# Patient Record
Sex: FEMALE | Race: WHITE | NOT HISPANIC OR LATINO
[De-identification: names, ages, dates, MRNs, and addresses within clinical notes are randomized per-mention and may not be internally consistent; named-entity substitution may affect disease eponyms.]

---

## 2017-05-08 ENCOUNTER — APPOINTMENT (OUTPATIENT)
Dept: PULMONOLOGY | Facility: CLINIC | Age: 67
End: 2017-05-08

## 2017-06-22 ENCOUNTER — RESULT REVIEW (OUTPATIENT)
Age: 67
End: 2017-06-22

## 2017-07-27 ENCOUNTER — APPOINTMENT (OUTPATIENT)
Dept: UROLOGY | Facility: CLINIC | Age: 67
End: 2017-07-27
Payer: MEDICARE

## 2017-07-27 VITALS — DIASTOLIC BLOOD PRESSURE: 82 MMHG | OXYGEN SATURATION: 97 % | HEART RATE: 70 BPM | SYSTOLIC BLOOD PRESSURE: 128 MMHG

## 2017-07-27 PROCEDURE — 99204 OFFICE O/P NEW MOD 45 MIN: CPT

## 2017-08-01 ENCOUNTER — OUTPATIENT (OUTPATIENT)
Dept: OUTPATIENT SERVICES | Facility: HOSPITAL | Age: 67
LOS: 1 days | End: 2017-08-01
Payer: MEDICARE

## 2017-08-01 DIAGNOSIS — M54.16 RADICULOPATHY, LUMBAR REGION: ICD-10-CM

## 2017-08-01 PROCEDURE — 77003 FLUOROGUIDE FOR SPINE INJECT: CPT

## 2017-08-01 PROCEDURE — 62323 NJX INTERLAMINAR LMBR/SAC: CPT

## 2017-08-02 LAB
APPEARANCE: ABNORMAL
BACTERIA UR CULT: NORMAL
BACTERIA: ABNORMAL
BILIRUBIN URINE: NEGATIVE
BLOOD URINE: ABNORMAL
CALCIUM OXALATE CRYSTALS: ABNORMAL
COLOR: ABNORMAL
GLUCOSE QUALITATIVE U: NORMAL MG/DL
HYALINE CASTS: 0 /LPF
KETONES URINE: NEGATIVE
LEUKOCYTE ESTERASE URINE: NEGATIVE
MICROSCOPIC-UA: NORMAL
NITRITE URINE: NEGATIVE
PH URINE: 5.5
PROTEIN URINE: ABNORMAL MG/DL
RED BLOOD CELLS URINE: 4 /HPF
SPECIFIC GRAVITY URINE: 1.02
SQUAMOUS EPITHELIAL CELLS: >27 /HPF
UROBILINOGEN URINE: NORMAL MG/DL
WHITE BLOOD CELLS URINE: 14 /HPF

## 2017-08-21 ENCOUNTER — APPOINTMENT (OUTPATIENT)
Dept: UROLOGY | Facility: CLINIC | Age: 67
End: 2017-08-21
Payer: MEDICARE

## 2017-08-21 VITALS — HEART RATE: 74 BPM | OXYGEN SATURATION: 97 % | SYSTOLIC BLOOD PRESSURE: 163 MMHG | DIASTOLIC BLOOD PRESSURE: 80 MMHG

## 2017-08-21 DIAGNOSIS — N39.0 URINARY TRACT INFECTION, SITE NOT SPECIFIED: ICD-10-CM

## 2017-08-21 DIAGNOSIS — R31.29 OTHER MICROSCOPIC HEMATURIA: ICD-10-CM

## 2017-08-21 DIAGNOSIS — R82.90 UNSPECIFIED ABNORMAL FINDINGS IN URINE: ICD-10-CM

## 2017-08-21 PROCEDURE — 51701 INSERT BLADDER CATHETER: CPT

## 2017-08-23 LAB
APPEARANCE: CLEAR
BACTERIA UR CULT: NORMAL
BACTERIA: NEGATIVE
BILIRUBIN URINE: NEGATIVE
BLOOD URINE: ABNORMAL
COLOR: YELLOW
GLUCOSE QUALITATIVE U: NORMAL MG/DL
HYALINE CASTS: 1 /LPF
KETONES URINE: NEGATIVE
LEUKOCYTE ESTERASE URINE: NEGATIVE
MICROSCOPIC-UA: NORMAL
NITRITE URINE: NEGATIVE
PH URINE: 5.5
PROTEIN URINE: NEGATIVE MG/DL
RED BLOOD CELLS URINE: 1 /HPF
SPECIFIC GRAVITY URINE: 1.01
SQUAMOUS EPITHELIAL CELLS: 0 /HPF
UROBILINOGEN URINE: NORMAL MG/DL
WHITE BLOOD CELLS URINE: 0 /HPF

## 2017-09-07 ENCOUNTER — OUTPATIENT (OUTPATIENT)
Dept: OUTPATIENT SERVICES | Facility: HOSPITAL | Age: 67
LOS: 1 days | Discharge: HOME | End: 2017-09-07
Payer: MEDICARE

## 2017-09-07 DIAGNOSIS — M54.16 RADICULOPATHY, LUMBAR REGION: ICD-10-CM

## 2017-09-07 PROCEDURE — 77003 FLUOROGUIDE FOR SPINE INJECT: CPT

## 2017-09-07 PROCEDURE — 62323 NJX INTERLAMINAR LMBR/SAC: CPT

## 2017-10-17 ENCOUNTER — OUTPATIENT (OUTPATIENT)
Dept: OUTPATIENT SERVICES | Facility: HOSPITAL | Age: 67
LOS: 1 days | End: 2017-10-17
Payer: MEDICARE

## 2017-10-17 DIAGNOSIS — M54.16 RADICULOPATHY, LUMBAR REGION: ICD-10-CM

## 2017-10-17 PROCEDURE — 64483 NJX AA&/STRD TFRM EPI L/S 1: CPT | Mod: RT

## 2017-10-17 PROCEDURE — 64484 NJX AA&/STRD TFRM EPI L/S EA: CPT | Mod: RT

## 2017-10-17 PROCEDURE — 77003 FLUOROGUIDE FOR SPINE INJECT: CPT

## 2018-02-06 ENCOUNTER — OUTPATIENT (OUTPATIENT)
Dept: OUTPATIENT SERVICES | Facility: HOSPITAL | Age: 68
LOS: 1 days | End: 2018-02-06
Payer: MEDICARE

## 2018-02-06 ENCOUNTER — APPOINTMENT (OUTPATIENT)
Dept: RADIOLOGY | Facility: CLINIC | Age: 68
End: 2018-02-06
Payer: MEDICARE

## 2018-02-06 DIAGNOSIS — Z00.8 ENCOUNTER FOR OTHER GENERAL EXAMINATION: ICD-10-CM

## 2018-02-06 PROCEDURE — 71046 X-RAY EXAM CHEST 2 VIEWS: CPT | Mod: 26

## 2018-02-06 PROCEDURE — 71046 X-RAY EXAM CHEST 2 VIEWS: CPT

## 2018-09-20 ENCOUNTER — OUTPATIENT (OUTPATIENT)
Dept: OUTPATIENT SERVICES | Facility: HOSPITAL | Age: 68
LOS: 1 days | End: 2018-09-20
Payer: MEDICARE

## 2018-09-20 DIAGNOSIS — M54.16 RADICULOPATHY, LUMBAR REGION: ICD-10-CM

## 2018-09-20 PROCEDURE — 64483 NJX AA&/STRD TFRM EPI L/S 1: CPT | Mod: RT

## 2018-09-20 PROCEDURE — 64484 NJX AA&/STRD TFRM EPI L/S EA: CPT | Mod: RT

## 2018-09-20 PROCEDURE — 77003 FLUOROGUIDE FOR SPINE INJECT: CPT

## 2019-04-16 ENCOUNTER — RESULT REVIEW (OUTPATIENT)
Age: 69
End: 2019-04-16

## 2020-03-14 ENCOUNTER — TRANSCRIPTION ENCOUNTER (OUTPATIENT)
Age: 70
End: 2020-03-14

## 2020-03-22 ENCOUNTER — TRANSCRIPTION ENCOUNTER (OUTPATIENT)
Age: 70
End: 2020-03-22

## 2020-05-21 ENCOUNTER — TRANSCRIPTION ENCOUNTER (OUTPATIENT)
Age: 70
End: 2020-05-21

## 2020-06-29 DIAGNOSIS — G47.00 INSOMNIA, UNSPECIFIED: ICD-10-CM

## 2020-06-29 DIAGNOSIS — E53.8 DEFICIENCY OF OTHER SPECIFIED B GROUP VITAMINS: ICD-10-CM

## 2020-06-29 DIAGNOSIS — I34.0 NONRHEUMATIC MITRAL (VALVE) INSUFFICIENCY: ICD-10-CM

## 2020-06-29 DIAGNOSIS — I51.89 OTHER ILL-DEFINED HEART DISEASES: ICD-10-CM

## 2020-06-29 DIAGNOSIS — I65.29 OCCLUSION AND STENOSIS OF UNSPECIFIED CAROTID ARTERY: ICD-10-CM

## 2020-06-29 DIAGNOSIS — R60.9 EDEMA, UNSPECIFIED: ICD-10-CM

## 2020-06-29 DIAGNOSIS — I07.1 RHEUMATIC TRICUSPID INSUFFICIENCY: ICD-10-CM

## 2020-06-29 DIAGNOSIS — E55.9 VITAMIN D DEFICIENCY, UNSPECIFIED: ICD-10-CM

## 2020-06-29 DIAGNOSIS — I51.7 CARDIOMEGALY: ICD-10-CM

## 2020-06-29 RX ORDER — GABAPENTIN 300 MG/1
300 CAPSULE ORAL TWICE DAILY
Refills: 0 | Status: ACTIVE | COMMUNITY
Start: 2020-06-29

## 2020-06-29 RX ORDER — FUROSEMIDE 20 MG/1
20 TABLET ORAL
Refills: 0 | Status: DISCONTINUED | COMMUNITY
End: 2020-06-29

## 2020-06-29 RX ORDER — ZOLPIDEM TARTRATE 10 MG/1
10 TABLET, FILM COATED ORAL
Refills: 0 | Status: DISCONTINUED | COMMUNITY
End: 2020-06-29

## 2020-06-29 RX ORDER — AMLODIPINE BESYLATE 2.5 MG/1
2.5 TABLET ORAL
Refills: 0 | Status: DISCONTINUED | COMMUNITY
End: 2020-06-29

## 2020-06-29 RX ORDER — LEVOTHYROXINE SODIUM 137 UG/1
137 TABLET ORAL
Refills: 0 | Status: DISCONTINUED | COMMUNITY
End: 2020-06-29

## 2020-06-29 RX ORDER — LOSARTAN POTASSIUM 50 MG/1
50 TABLET, FILM COATED ORAL
Qty: 90 | Refills: 1 | Status: ACTIVE | COMMUNITY
Start: 2020-06-29

## 2020-06-29 RX ORDER — TRAZODONE HYDROCHLORIDE 100 MG/1
100 TABLET ORAL
Refills: 0 | Status: ACTIVE | COMMUNITY
Start: 2020-06-29

## 2020-06-29 RX ORDER — LEVOTHYROXINE SODIUM 0.14 MG/1
137 TABLET ORAL DAILY
Refills: 0 | Status: ACTIVE | COMMUNITY
Start: 2020-06-29

## 2020-07-01 ENCOUNTER — APPOINTMENT (OUTPATIENT)
Dept: ELECTROPHYSIOLOGY | Facility: CLINIC | Age: 70
End: 2020-07-01
Payer: MEDICARE

## 2020-07-01 VITALS
DIASTOLIC BLOOD PRESSURE: 78 MMHG | WEIGHT: 176 LBS | HEART RATE: 60 BPM | SYSTOLIC BLOOD PRESSURE: 132 MMHG | HEIGHT: 65 IN | BODY MASS INDEX: 29.32 KG/M2

## 2020-07-01 DIAGNOSIS — F48.8 OTHER SPECIFIED NONPSYCHOTIC MENTAL DISORDERS: ICD-10-CM

## 2020-07-01 DIAGNOSIS — R55 SYNCOPE AND COLLAPSE: ICD-10-CM

## 2020-07-01 PROCEDURE — 99204 OFFICE O/P NEW MOD 45 MIN: CPT

## 2020-07-01 NOTE — REVIEW OF SYSTEMS
[Shortness Of Breath] : no shortness of breath [Dyspnea on exertion] : not dyspnea during exertion [Chest  Pressure] : no chest pressure [Chest Pain] : no chest pain [Lower Ext Edema] : no extremity edema [Palpitations] : palpitations [Dizziness] : dizziness [Nausea] : nausea [Confusion] : no confusion was observed [Convulsions] : no convulsions [Anxiety] : anxiety [Easy Bleeding] : no tendency for easy bleeding [Easy Bruising] : no tendency for easy bruising [Negative] : Integumentary

## 2020-07-01 NOTE — HISTORY OF PRESENT ILLNESS
[FreeTextEntry1] : 70 year old woman with history of HTN, HLD, hypothyroidism, LBBB, presenting for evaluation of syncope and palpitation. \par She has had three episodes of syncope over the last 1.5 yrs, the last occurred in 5/2020. During these episodes she notes sudden onset of rapid and regular palpitation, followed by feeling of dizziness (she describes the room spinning) and then subsequently passes out. She feels well when she wakes up, and has not sought urgent medical attention. She has not had trauma with these episodes, and she usually feels time to sit down first. She has noted nausea after the episodes as well.\par She has also had palpitation over the last year, which have occurred frequently without associated lightheadedness. \par At other times, she gets dizziness without palpitation. During this she feels the room spinning for up to a couple minutes.\par She had been previously given Toprol for her symptoms, but felt worse and could not tolerate it.  \par She otherwise feels generally well, but has had fatigue, and stable exertional dyspnea, as well as anxiety. \par Holter monitor performed 8/8/19 revealed sinus rhythm with average HR 65 bpm (range  bpm), frequent PVCs, couplets, bigeminy and trigeminy (total 6053, 6.2% PVC burden). The PVCs appear to have an outflow tract etiology. She does not recall if she had symptoms during this monitor. \par ECG reveals sinus rhythm with LBBB and  ms.\par TTE has revealed normal LV function, and stress test showed no ischemia or infarction.\par Current meds include synthroid, losartan, lasix\par

## 2020-07-01 NOTE — REASON FOR VISIT
[Palpitations] : palpitations [Consultation] : a consultation regarding [Syncope] : syncope [FreeTextEntry1] : ref Dr Foster

## 2020-07-01 NOTE — PHYSICAL EXAM
[Well Groomed] : well groomed [General Appearance - Well Developed] : well developed [General Appearance - Well Nourished] : well nourished [General Appearance - In No Acute Distress] : no acute distress [Normal Jugular Venous V Waves Present] : normal jugular venous V waves present [Normal Conjunctiva] : the conjunctiva exhibited no abnormalities [Normal Oral Mucosa] : normal oral mucosa [Heart Rate And Rhythm] : heart rate and rhythm were normal [Heart Sounds] : normal S1 and S2 [FreeTextEntry1] : 2/6 MANUELITO, KARLI [Edema] : no peripheral edema present [Auscultation Breath Sounds / Voice Sounds] : lungs were clear to auscultation bilaterally [Abdomen Soft] : soft [Respiration, Rhythm And Depth] : normal respiratory rhythm and effort [Abnormal Walk] : normal gait [Abdomen Tenderness] : non-tender [Nail Clubbing] : no clubbing of the fingernails [] : no rash [Cyanosis, Localized] : no localized cyanosis [Oriented To Time, Place, And Person] : oriented to person, place, and time [Impaired Insight] : insight and judgment were intact

## 2020-07-01 NOTE — DISCUSSION/SUMMARY
[FreeTextEntry1] : 70 year old woman with history of HTN, HLD, hypothyroidism, LBBB, presenting for evaluation of syncope and palpitation. She has had episodes of syncope which are preceded by palpitation, as well as other episodes of palpitation without syncope, as well as dizziness. She has conduction disease with LBBB, and had rather frequent PVCs on Holter monitoring last year, but it is unclear if her symptoms are related to the PVCs, potentially VT, or perhaps bradyarrhythmias. We discussed that it is also possible that her symptoms may not be cardiac in origin, and there is a potential for vagal etiology or perhaps vertigo given her description of the symptoms and associated nausea. However, given her clear conduction disease, frequent PVCs, and risk factors, further cardiac evaluation is warranted. We discussed monitoring options, as well as EP study to evaluate extent of conduction disease. If significant conduction disease is noted, a ppm would be beneficial to prevent further syncope and progression to heart block. If no significant conduction disease is noted, an ILR implant would be useful for ongoing monitoring and symptom correlation. Finally, if a tachyarrhythmia is inducible, such as VT or SVT, or if the PVCs are thought to contribute to her symptoms, a targeted ablation may be the best approach, particularly as she has not been able to tolerate medical therapy with beta blockade in the past. The risks and benefits of these procedures, including procedure related risks of EP study, device implant, and ablation (such as bleeding, vascular injury, cardiac perforation, stroke, infection, pulmonary injury) were reviewed. She expressed understanding and does agree to proceed. \par -EP study, possible PPM or ILR implant depending on findings. Also possible ablation of SVT/PVCs/VT if indicated. \par  \par

## 2020-07-02 PROBLEM — Z00.00 ENCOUNTER FOR PREVENTIVE HEALTH EXAMINATION: Noted: 2020-07-02

## 2020-07-06 ENCOUNTER — APPOINTMENT (OUTPATIENT)
Dept: DISASTER EMERGENCY | Facility: CLINIC | Age: 70
End: 2020-07-06

## 2020-07-06 DIAGNOSIS — Z01.818 ENCOUNTER FOR OTHER PREPROCEDURAL EXAMINATION: ICD-10-CM

## 2020-07-07 LAB — SARS-COV-2 N GENE NPH QL NAA+PROBE: NOT DETECTED

## 2020-07-20 ENCOUNTER — APPOINTMENT (OUTPATIENT)
Dept: DISASTER EMERGENCY | Facility: CLINIC | Age: 70
End: 2020-07-20

## 2020-07-20 DIAGNOSIS — Z01.818 ENCOUNTER FOR OTHER PREPROCEDURAL EXAMINATION: ICD-10-CM

## 2020-07-23 LAB — SARS-COV-2 N GENE NPH QL NAA+PROBE: NOT DETECTED

## 2020-08-15 ENCOUNTER — APPOINTMENT (OUTPATIENT)
Dept: DISASTER EMERGENCY | Facility: CLINIC | Age: 70
End: 2020-08-15

## 2020-08-16 LAB — SARS-COV-2 N GENE NPH QL NAA+PROBE: NOT DETECTED

## 2020-08-17 ENCOUNTER — TRANSCRIPTION ENCOUNTER (OUTPATIENT)
Age: 70
End: 2020-08-17

## 2020-08-17 ENCOUNTER — OUTPATIENT (OUTPATIENT)
Dept: OUTPATIENT SERVICES | Facility: HOSPITAL | Age: 70
LOS: 1 days | Discharge: HOME | End: 2020-08-17
Payer: MEDICARE

## 2020-08-17 VITALS
SYSTOLIC BLOOD PRESSURE: 199 MMHG | DIASTOLIC BLOOD PRESSURE: 102 MMHG | OXYGEN SATURATION: 99 % | RESPIRATION RATE: 18 BRPM | HEIGHT: 64 IN | HEART RATE: 91 BPM | TEMPERATURE: 98 F | WEIGHT: 184.97 LBS

## 2020-08-17 VITALS
DIASTOLIC BLOOD PRESSURE: 73 MMHG | SYSTOLIC BLOOD PRESSURE: 174 MMHG | HEART RATE: 70 BPM | RESPIRATION RATE: 18 BRPM | OXYGEN SATURATION: 98 %

## 2020-08-17 DIAGNOSIS — E03.9 HYPOTHYROIDISM, UNSPECIFIED: ICD-10-CM

## 2020-08-17 DIAGNOSIS — R55 SYNCOPE AND COLLAPSE: ICD-10-CM

## 2020-08-17 DIAGNOSIS — I44.7 LEFT BUNDLE-BRANCH BLOCK, UNSPECIFIED: ICD-10-CM

## 2020-08-17 DIAGNOSIS — I10 ESSENTIAL (PRIMARY) HYPERTENSION: ICD-10-CM

## 2020-08-17 DIAGNOSIS — I49.3 VENTRICULAR PREMATURE DEPOLARIZATION: ICD-10-CM

## 2020-08-17 LAB
ABO RH CONFIRMATION: SIGNIFICANT CHANGE UP
ANION GAP SERPL CALC-SCNC: 14 MMOL/L — SIGNIFICANT CHANGE UP (ref 5–17)
APTT BLD: 31.5 SEC — SIGNIFICANT CHANGE UP (ref 27.5–35.5)
BASOPHILS # BLD AUTO: 0.05 K/UL — SIGNIFICANT CHANGE UP (ref 0–0.2)
BASOPHILS NFR BLD AUTO: 0.9 % — SIGNIFICANT CHANGE UP (ref 0–2)
BLD GP AB SCN SERPL QL: SIGNIFICANT CHANGE UP
BUN SERPL-MCNC: 18 MG/DL — SIGNIFICANT CHANGE UP (ref 8–20)
CALCIUM SERPL-MCNC: 9.6 MG/DL — SIGNIFICANT CHANGE UP (ref 8.6–10.2)
CHLORIDE SERPL-SCNC: 100 MMOL/L — SIGNIFICANT CHANGE UP (ref 98–107)
CO2 SERPL-SCNC: 27 MMOL/L — SIGNIFICANT CHANGE UP (ref 22–29)
CREAT SERPL-MCNC: 1.06 MG/DL — SIGNIFICANT CHANGE UP (ref 0.5–1.3)
EOSINOPHIL # BLD AUTO: 0.16 K/UL — SIGNIFICANT CHANGE UP (ref 0–0.5)
EOSINOPHIL NFR BLD AUTO: 2.9 % — SIGNIFICANT CHANGE UP (ref 0–6)
GLUCOSE SERPL-MCNC: 95 MG/DL — SIGNIFICANT CHANGE UP (ref 70–99)
HCT VFR BLD CALC: 46 % — HIGH (ref 34.5–45)
HGB BLD-MCNC: 15 G/DL — SIGNIFICANT CHANGE UP (ref 11.5–15.5)
IMM GRANULOCYTES NFR BLD AUTO: 0.4 % — SIGNIFICANT CHANGE UP (ref 0–1.5)
INR BLD: 1.01 RATIO — SIGNIFICANT CHANGE UP (ref 0.88–1.16)
LYMPHOCYTES # BLD AUTO: 1.52 K/UL — SIGNIFICANT CHANGE UP (ref 1–3.3)
LYMPHOCYTES # BLD AUTO: 27.7 % — SIGNIFICANT CHANGE UP (ref 13–44)
MAGNESIUM SERPL-MCNC: 2.2 MG/DL — SIGNIFICANT CHANGE UP (ref 1.6–2.6)
MCHC RBC-ENTMCNC: 30.2 PG — SIGNIFICANT CHANGE UP (ref 27–34)
MCHC RBC-ENTMCNC: 32.6 GM/DL — SIGNIFICANT CHANGE UP (ref 32–36)
MCV RBC AUTO: 92.6 FL — SIGNIFICANT CHANGE UP (ref 80–100)
MONOCYTES # BLD AUTO: 0.48 K/UL — SIGNIFICANT CHANGE UP (ref 0–0.9)
MONOCYTES NFR BLD AUTO: 8.7 % — SIGNIFICANT CHANGE UP (ref 2–14)
NEUTROPHILS # BLD AUTO: 3.26 K/UL — SIGNIFICANT CHANGE UP (ref 1.8–7.4)
NEUTROPHILS NFR BLD AUTO: 59.4 % — SIGNIFICANT CHANGE UP (ref 43–77)
PLATELET # BLD AUTO: 284 K/UL — SIGNIFICANT CHANGE UP (ref 150–400)
POTASSIUM SERPL-MCNC: 3.9 MMOL/L — SIGNIFICANT CHANGE UP (ref 3.5–5.3)
POTASSIUM SERPL-SCNC: 3.9 MMOL/L — SIGNIFICANT CHANGE UP (ref 3.5–5.3)
PROTHROM AB SERPL-ACNC: 11.7 SEC — SIGNIFICANT CHANGE UP (ref 10.6–13.6)
RBC # BLD: 4.97 M/UL — SIGNIFICANT CHANGE UP (ref 3.8–5.2)
RBC # FLD: 12.8 % — SIGNIFICANT CHANGE UP (ref 10.3–14.5)
SODIUM SERPL-SCNC: 141 MMOL/L — SIGNIFICANT CHANGE UP (ref 135–145)
WBC # BLD: 5.49 K/UL — SIGNIFICANT CHANGE UP (ref 3.8–10.5)
WBC # FLD AUTO: 5.49 K/UL — SIGNIFICANT CHANGE UP (ref 3.8–10.5)

## 2020-08-17 PROCEDURE — 86900 BLOOD TYPING SEROLOGIC ABO: CPT

## 2020-08-17 PROCEDURE — 33285 INSJ SUBQ CAR RHYTHM MNTR: CPT | Mod: 59

## 2020-08-17 PROCEDURE — 85610 PROTHROMBIN TIME: CPT

## 2020-08-17 PROCEDURE — 85027 COMPLETE CBC AUTOMATED: CPT

## 2020-08-17 PROCEDURE — 93005 ELECTROCARDIOGRAM TRACING: CPT

## 2020-08-17 PROCEDURE — C1764: CPT

## 2020-08-17 PROCEDURE — C1731: CPT

## 2020-08-17 PROCEDURE — 36415 COLL VENOUS BLD VENIPUNCTURE: CPT

## 2020-08-17 PROCEDURE — 93620 COMP EP EVL R AT VEN PAC&REC: CPT

## 2020-08-17 PROCEDURE — C1730: CPT

## 2020-08-17 PROCEDURE — C1760: CPT

## 2020-08-17 PROCEDURE — 85730 THROMBOPLASTIN TIME PARTIAL: CPT

## 2020-08-17 PROCEDURE — 86901 BLOOD TYPING SEROLOGIC RH(D): CPT

## 2020-08-17 PROCEDURE — 93010 ELECTROCARDIOGRAM REPORT: CPT | Mod: 76

## 2020-08-17 PROCEDURE — 83735 ASSAY OF MAGNESIUM: CPT

## 2020-08-17 PROCEDURE — 93620 COMP EP EVL R AT VEN PAC&REC: CPT | Mod: 26

## 2020-08-17 PROCEDURE — 86850 RBC ANTIBODY SCREEN: CPT

## 2020-08-17 PROCEDURE — 80048 BASIC METABOLIC PNL TOTAL CA: CPT

## 2020-08-17 RX ORDER — ACETAMINOPHEN 500 MG
650 TABLET ORAL EVERY 6 HOURS
Refills: 0 | Status: DISCONTINUED | OUTPATIENT
Start: 2020-08-17 | End: 2020-09-01

## 2020-08-17 RX ORDER — MEPERIDINE HYDROCHLORIDE 50 MG/ML
12.5 INJECTION INTRAMUSCULAR; INTRAVENOUS; SUBCUTANEOUS ONCE
Refills: 0 | Status: DISCONTINUED | OUTPATIENT
Start: 2020-08-17 | End: 2020-08-17

## 2020-08-17 RX ADMIN — MEPERIDINE HYDROCHLORIDE 12.5 MILLIGRAM(S): 50 INJECTION INTRAMUSCULAR; INTRAVENOUS; SUBCUTANEOUS at 12:04

## 2020-08-17 RX ADMIN — MEPERIDINE HYDROCHLORIDE 12.5 MILLIGRAM(S): 50 INJECTION INTRAMUSCULAR; INTRAVENOUS; SUBCUTANEOUS at 11:16

## 2020-08-17 NOTE — DISCHARGE NOTE PROVIDER - NSDCMRMEDTOKEN_GEN_ALL_CORE_FT
furosemide 20 mg oral tablet: 1 tab(s) orally once a day  gabapentin 300 mg oral capsule: 1 cap(s) orally 2 times a day  losartan 50 mg oral tablet: 1 tab(s) orally once a day  Synthroid 137 mcg (0.137 mg) oral tablet: 1 tab(s) orally once a day  traZODone 100 mg oral tablet: 1 tab(s) orally once a day (at bedtime)

## 2020-08-17 NOTE — H&P PST ADULT - ASSESSMENT
70 year old woman with history of HTN, HLD, hypothyroidism, LBBB, recurrent syncope and palpitation with frequent PVCs on holter monitor. She presents for elective EP study and possible ablation vs device implant.    Plan:   Pt NPO.   BP management per anesthesia.   Consent w/ Dr. Salazar.

## 2020-08-17 NOTE — DISCHARGE NOTE PROVIDER - CARE PROVIDERS DIRECT ADDRESSES
[email or web address]

## 2020-08-17 NOTE — DISCHARGE NOTE NURSING/CASE MANAGEMENT/SOCIAL WORK - PATIENT PORTAL LINK FT
You can access the FollowMyHealth Patient Portal offered by [facility] by registering at the following website: [email or web address]. By joining [employer]’s FollowMyHealth portal, you will also be able to view your health information using other applications (apps) compatible with our system.

## 2020-08-17 NOTE — DISCHARGE NOTE PROVIDER - HOSPITAL COURSE
70 year old woman with history of HTN, HLD, hypothyroidism, LBBB, recurrent syncope and palpitation with frequent PVCs on holter monitor. She presented electively 8/17/20 and underwent EP study limited by incessant AF following catheter placement despite ibutilide with multiple DCCVs, but demonstrating no significant conduction abnormalities (despite LBBB), possible dual AV levon physiology (FL>RR) with no SVT identified, and no accessory pathway conduction, as well as ILR implant to evaluate for clinical arrhythmias, correlate with symptoms and guide further management. The patient was observed per post procedure protocol, then discharged home with a plan for outpatient follow up.

## 2020-08-17 NOTE — H&P PST ADULT - HISTORY OF PRESENT ILLNESS
70 year old woman with history of HTN, HLD, hypothyroidism, LBBB, syncope and palpitation. She has had three episodes of syncope over the last 1.5 yrs, most recently 5/2020. During these episodes she notes sudden onset of rapid and regular palpitation, followed by feeling of dizziness described as the room spinning and then subsequently passes out. She notes nausea when waking up, but otherwise feels well, and has not sought urgent medical attention. She has generally been able to sit down before syncopizing and has not had any trauma. She has also had palpitation over the last year, which have occurred frequently without associated lightheadedness, and has also had dizziness without palpitation described as the room spinning.    Holter monitor performed 8/8/19 revealed sinus rhythm with average HR 65 bpm (range  bpm), frequent PVCs, couplets, bigeminy and trigeminy (total 6053, 6.2% PVC burden). The PVCs appear to have an outflow tract etiology. She does not recall if she had symptoms during this monitor.     She presents for elective EP study and possible ablation vs device implant. Patient currently denies chest pain, shortness of breath at rest or with exertion, near/true syncope, fevers/chills, N/V/D, or other cardiac or constitutional symptoms.     On arrival, she reports she presented ~3 weeks ago for cataract surgery, and her blood pressure was noted to be elevated with SBP ~200. She spoke to her primary care physician who advised her to monitor her blood pressure over the weekend and follow up. She has not been monitoring her BP at home and has not been seen by her PCP or cardiologist. Her BP today is 200/100; she reports she took her losartan this AM.       Cardiology Summary  Stress Test: 8/19/19, normal, LVEF 64%. No ischemia/infarct.   Echo: 8/6/19, LVEF 55%, LA 3.1cm, mild MR, TR. RVSP<35   Cardiac Cath: 5/28/10, no significant CAD, only minor luminal irregularities.

## 2020-08-17 NOTE — PROGRESS NOTE ADULT - SUBJECTIVE AND OBJECTIVE BOX
Pt doing well post EPS/ILR.   Remains in sinus rhythm w/ baseline LBBB. No acute changes or arrhythmias.   Groin stable - ambulating without difficulty.     Ok to d/c home w/ outpt follow up in 2-4 weeks.

## 2020-08-17 NOTE — PROGRESS NOTE ADULT - SUBJECTIVE AND OBJECTIVE BOX
PROCEDURE(S): Electrophysiology Study and ILR Implant    ELECTRPHYSIOLOGIST(S): Alexis Salazar MD         COMPLICATIONS:  none        DISPOSITION: observation      CONDITION: stable      Pt doing well s/p EPS with incessant AF after catheter placement despite ibutilide (requiring multiple DCCVs) and ILR implant. Denies complaint.       Home Medications:  furosemide 20 mg oral tablet: 1 tab(s) orally once a day (17 Aug 2020 07:39)  gabapentin 300 mg oral capsule: 1 cap(s) orally 2 times a day (17 Aug 2020 07:39)  losartan 50 mg oral tablet: 1 tab(s) orally once a day (17 Aug 2020 07:39)  Synthroid 137 mcg (0.137 mg) oral tablet: 1 tab(s) orally once a day (17 Aug 2020 07:39)  traZODone 100 mg oral tablet: 1 tab(s) orally once a day (at bedtime) (17 Aug 2020 07:39)    MEDICATIONS  (PRN):  acetaminophen   Tablet .. 650 milliGRAM(s) Oral every 6 hours PRN Mild Pain (1 - 3)      Allergies  IV Contrast (Anaphylaxis)  penicillin (Hives; Rash)  shellfish (Angioedema; Rash; Flushing)      Exam:   T(C): 36.7 (08-17-20 @ 07:42), Max: 36.7 (08-17-20 @ 07:42)  HR: 91 (08-17-20 @ 07:42) (91 - 91)  BP: 199/102 (08-17-20 @ 07:42) (199/102 - 199/102)  RR: 18 (08-17-20 @ 07:42) (18 - 18)  SpO2: 99% (08-17-20 @ 07:42) (99% - 99%)    VSS, NAD, A&O x 3  Card: S1/S2, RRR, no m/g/r  ILR site: dermabond C/D/I; no bleeding, hematoma, erythema, exudate or edema  Resp: lungs CTA b/l  Abd: S/NT/ND  Groins: b/l femoral venous access hemostasis achieved via Vascade closure device x 4; sites C/D/I; no bleeding, hematoma, erythema, exudate or edema  Ext: baseline non pitting pedal edema L>R; distal pulses intact    ECG: SR w/ baseline LBBB; no acute changes.     Assessment:   70 year old woman with history of HTN, HLD, hypothyroidism, LBBB, recurrent syncope and palpitation with frequent PVCs on holter monitor. Now status post EP study limited by incessant AF following catheter placement despite ibutilide with multiple DCCVs, but demonstrating no significant conduction abnormalities (despite LBBB), possible dual AV levon physiology (IL>RR) with no SVT identified, and no accessory pathway conduction, as well as ILR implant to evaluate for clinical arrhythmias, correlate with symptoms and guide further management. Femoral venous hemostasis with Vascade closure devices x 4.       Plan:   Mild post anesthesia rigors - give demoral 12.5mg x 1 dose now.   Bedrest x 2 hours, then OOB with assistance and progress as tolerated.   Continue home medications. Notably, BP normalized immediately when anesthesia was administered (no anti-hypertensives given) and remains WNL now.   Please encourage ambulation once able.  Given ibutilide administration, will continue monitoring on telemetry x 6 hours.   Anticipated discharge later today with outpt follow up in 2-4 weeks.

## 2020-08-17 NOTE — DISCHARGE NOTE PROVIDER - CARE PROVIDER_API CALL
Alexis Salazar  CARDIOLOGY  [address] [place] [address]  Phone: [phone]  Fax: [phone]  Follow Up Time:     Walter Foster  CARDIOVASCULAR DISEASE  [address] [place] [id number]  Phone: [phone]  Fax: [phone]  Follow Up Time:

## 2020-08-17 NOTE — H&P PST ADULT - NSICDXPASTMEDICALHX_GEN_ALL_CORE_FT
PAST MEDICAL HISTORY:  Hypertension     Hypothyroid     LBBB (left bundle branch block)     PVC (premature ventricular contraction)

## 2020-08-17 NOTE — DISCHARGE NOTE PROVIDER - NSDCFUADDINST_GEN_ALL_CORE_FT
Follow up with Dr. Salazar in 2-4 weeks. Our office will contact you in 3-5 days to schedule this appointment. Please call [phone] with questions or concerns.

## 2021-12-07 ENCOUNTER — RESULT REVIEW (OUTPATIENT)
Age: 71
End: 2021-12-07

## 2022-04-11 ENCOUNTER — APPOINTMENT (OUTPATIENT)
Dept: ULTRASOUND IMAGING | Facility: CLINIC | Age: 72
End: 2022-04-11
Payer: MEDICARE

## 2022-04-11 ENCOUNTER — OUTPATIENT (OUTPATIENT)
Dept: OUTPATIENT SERVICES | Facility: HOSPITAL | Age: 72
LOS: 1 days | End: 2022-04-11
Payer: MEDICARE

## 2022-04-11 ENCOUNTER — APPOINTMENT (OUTPATIENT)
Dept: MRI IMAGING | Facility: CLINIC | Age: 72
End: 2022-04-11
Payer: MEDICARE

## 2022-04-11 DIAGNOSIS — Z00.00 ENCOUNTER FOR GENERAL ADULT MEDICAL EXAMINATION WITHOUT ABNORMAL FINDINGS: ICD-10-CM

## 2022-04-11 PROCEDURE — 72148 MRI LUMBAR SPINE W/O DYE: CPT | Mod: MH

## 2022-04-11 PROCEDURE — 72148 MRI LUMBAR SPINE W/O DYE: CPT | Mod: 26,MH

## 2022-04-11 PROCEDURE — 76536 US EXAM OF HEAD AND NECK: CPT | Mod: 26

## 2022-04-11 PROCEDURE — 76536 US EXAM OF HEAD AND NECK: CPT

## 2022-04-13 DIAGNOSIS — Z95.818 PRESENCE OF OTHER CARDIAC IMPLANTS AND GRAFTS: ICD-10-CM

## 2022-04-21 ENCOUNTER — NON-APPOINTMENT (OUTPATIENT)
Age: 72
End: 2022-04-21

## 2022-04-21 ENCOUNTER — APPOINTMENT (OUTPATIENT)
Dept: SURGERY | Facility: CLINIC | Age: 72
End: 2022-04-21
Payer: MEDICARE

## 2022-04-21 ENCOUNTER — LABORATORY RESULT (OUTPATIENT)
Age: 72
End: 2022-04-21

## 2022-04-21 DIAGNOSIS — R22.1 LOCALIZED SWELLING, MASS AND LUMP, NECK: ICD-10-CM

## 2022-04-21 DIAGNOSIS — E04.1 NONTOXIC SINGLE THYROID NODULE: ICD-10-CM

## 2022-04-21 DIAGNOSIS — E03.9 HYPOTHYROIDISM, UNSPECIFIED: ICD-10-CM

## 2022-04-21 PROCEDURE — 10005 FNA BX W/US GDN 1ST LES: CPT

## 2022-04-21 PROCEDURE — 99204 OFFICE O/P NEW MOD 45 MIN: CPT

## 2022-04-21 NOTE — REASON FOR VISIT
[Initial Consultation] : an initial consultation for [FreeTextEntry2] : a right thyroid nodule in the setting of a left lateral neck subcutaneous nodule

## 2022-04-21 NOTE — PHYSICAL EXAM
[Midline] : located in midline position [Normal] : orientation to person, place, and time: normal [de-identified] : Extremities: BENOIT x 4.   Skin: No obvious skin lesions.   Voice: clear

## 2022-04-22 ENCOUNTER — NON-APPOINTMENT (OUTPATIENT)
Age: 72
End: 2022-04-22

## 2022-05-13 ENCOUNTER — OUTPATIENT (OUTPATIENT)
Dept: OUTPATIENT SERVICES | Facility: HOSPITAL | Age: 72
LOS: 1 days | End: 2022-05-13
Payer: MEDICARE

## 2022-05-13 DIAGNOSIS — Z20.828 CONTACT WITH AND (SUSPECTED) EXPOSURE TO OTHER VIRAL COMMUNICABLE DISEASES: ICD-10-CM

## 2022-05-13 LAB — SARS-COV-2 RNA SPEC QL NAA+PROBE: SIGNIFICANT CHANGE UP

## 2022-05-13 PROCEDURE — U0003: CPT

## 2022-05-13 PROCEDURE — U0005: CPT

## 2022-05-16 ENCOUNTER — OUTPATIENT (OUTPATIENT)
Dept: OUTPATIENT SERVICES | Facility: HOSPITAL | Age: 72
LOS: 1 days | End: 2022-05-16
Payer: MEDICARE

## 2022-05-16 DIAGNOSIS — M54.16 RADICULOPATHY, LUMBAR REGION: ICD-10-CM

## 2022-05-16 PROCEDURE — 62323 NJX INTERLAMINAR LMBR/SAC: CPT

## 2022-05-20 ENCOUNTER — TRANSCRIPTION ENCOUNTER (OUTPATIENT)
Age: 72
End: 2022-05-20

## 2022-05-20 ENCOUNTER — OUTPATIENT (OUTPATIENT)
Dept: OUTPATIENT SERVICES | Facility: HOSPITAL | Age: 72
LOS: 1 days | End: 2022-05-20
Payer: MEDICARE

## 2022-05-20 VITALS
WEIGHT: 186.95 LBS | HEART RATE: 61 BPM | DIASTOLIC BLOOD PRESSURE: 76 MMHG | RESPIRATION RATE: 16 BRPM | HEIGHT: 64 IN | SYSTOLIC BLOOD PRESSURE: 203 MMHG | OXYGEN SATURATION: 99 %

## 2022-05-20 VITALS
OXYGEN SATURATION: 99 % | DIASTOLIC BLOOD PRESSURE: 70 MMHG | RESPIRATION RATE: 15 BRPM | SYSTOLIC BLOOD PRESSURE: 169 MMHG | HEART RATE: 67 BPM

## 2022-05-20 DIAGNOSIS — R55 SYNCOPE AND COLLAPSE: ICD-10-CM

## 2022-05-20 DIAGNOSIS — Z98.890 OTHER SPECIFIED POSTPROCEDURAL STATES: Chronic | ICD-10-CM

## 2022-05-20 DIAGNOSIS — Z90.49 ACQUIRED ABSENCE OF OTHER SPECIFIED PARTS OF DIGESTIVE TRACT: Chronic | ICD-10-CM

## 2022-05-20 PROCEDURE — 33286 RMVL SUBQ CAR RHYTHM MNTR: CPT

## 2022-05-20 RX ORDER — ALPRAZOLAM 0.25 MG
0.25 TABLET ORAL ONCE
Refills: 0 | Status: DISCONTINUED | OUTPATIENT
Start: 2022-05-20 | End: 2022-05-20

## 2022-05-20 RX ORDER — LOSARTAN POTASSIUM 100 MG/1
50 TABLET, FILM COATED ORAL ONCE
Refills: 0 | Status: DISCONTINUED | OUTPATIENT
Start: 2022-05-20 | End: 2022-06-04

## 2022-05-20 RX ORDER — HYDRALAZINE HCL 50 MG
10 TABLET ORAL ONCE
Refills: 0 | Status: COMPLETED | OUTPATIENT
Start: 2022-05-20 | End: 2022-05-20

## 2022-05-20 RX ORDER — LOSARTAN POTASSIUM 100 MG/1
1 TABLET, FILM COATED ORAL
Qty: 30 | Refills: 3
Start: 2022-05-20 | End: 2022-09-16

## 2022-05-20 RX ADMIN — Medication 600 MILLIGRAM(S): at 14:29

## 2022-05-20 RX ADMIN — Medication 10 MILLIGRAM(S): at 14:29

## 2022-05-20 NOTE — DISCHARGE NOTE NURSING/CASE MANAGEMENT/SOCIAL WORK - PATIENT PORTAL LINK FT
You can access the FollowMyHealth Patient Portal offered by [facility] by registering at the following website: [email or web address]. By joining [employer]’s FollowMyHealth portal, you will also be able to view your health information using other applications (apps) compatible with our system.

## 2022-05-20 NOTE — H&P PST ADULT - COMMENTS
Denies fever, chills, CP, SOB, abd pain, N/V/D, hematuria, bloody or dark stools    +palpitations +headache

## 2022-05-20 NOTE — PROGRESS NOTE ADULT - SUBJECTIVE AND OBJECTIVE BOX
Procedure: Loop Recorder explant  Electrophysiologist: Alexis Salazar MD.    Pt doing well s/p loop recorder explant. Denies complaint.     Incision: Dermabond C/D/I; no bleeding, hematoma, erythema, exudate or edema    Plan:   Increase Losartan from 50mg to 100mg daily for better BP control. Patient instructed to check BP twice daily and keep and log and to f/u with Dr. Foster.   Resume PO intake.   Ambulate w/ assist, then progress as tolerated.     Pain control with PO analgesia PRN.   Resume other home medications.   D/C home once all criteria met, with outpt f/up with Dr. Salazar in 1-2 weeks.

## 2022-05-20 NOTE — ASU DISCHARGE PLAN (ADULT/PEDIATRIC) - ASU DC SPECIAL INSTRUCTIONSFT
Check your blood pressure twice daily and keep a log. Follow up with Dr. Herring for blood pressure control.    Loop Recorder Incision Care:     - Do not touch the incision until it is completely healed.   - There is Dermabond (skin glue) on your incision, which will start to flake off on its own over the next 2-3 weeks. Do not pick at or peel off the Dermabond.   - Do not apply soaps, creams, lotions, ointments or powders to the incision until it is completely healed.  - You should call the doctor if you notice redness, drainage, swelling, increased tenderness, hot sensation around the  incision, bleeding or incision edges pulling apart. Increase your Losartan from 50mg to 100mg daily. Check your blood pressure twice daily and keep a log. Follow up with Dr. Herring.    Loop Recorder Incision Care:     - Do not touch the incision until it is completely healed.   - There is Dermabond (skin glue) on your incision, which will start to flake off on its own over the next 2-3 weeks. Do not pick at or peel off the Dermabond.   - Do not apply soaps, creams, lotions, ointments or powders to the incision until it is completely healed.  - You should call the doctor if you notice redness, drainage, swelling, increased tenderness, hot sensation around the  incision, bleeding or incision edges pulling apart.

## 2022-05-20 NOTE — ASU DISCHARGE PLAN (ADULT/PEDIATRIC) - NS MD DC FALL RISK RISK
For information on Fall & Injury Prevention, visit: [email or web address] OR  [email or web address] OR  https://www.cdc.gov/steadi/patient.html

## 2022-05-20 NOTE — H&P PST ADULT - HISTORY OF PRESENT ILLNESS
70 year old woman with history of HTN, HLD, hypothyroidism, LBBB, recurrent syncope and palpitation found to have frequent PVCs on Holter monitoring and underwent EP study and ILR implant. ILR monitoring ***** and has now reached EOS. She now presents electively for ILR explant.     Cardiology Summary  Stress Test: 8/19/19, normal, LVEF 64%. No ischemia/infarct.   Echo: 8/6/19, LVEF 55%, LA 3.1cm, mild MR, TR. RVSP<35   Cardiac Cath: 5/28/10, no significant CAD, only minor luminal irregularities.     70 year old woman with history of HTN, HLD, hypothyroidism, LBBB, recurrent syncope and palpitation found to have frequent PVCs on Holter monitoring and underwent EP study and ILR implant in 8/2020. ILR monitoring revealed PVC burden of 1.1% and no events. She now presents electively for ILR explant.     Cardiology Summary  Stress Test: 8/19/19, normal, LVEF 64%. No ischemia/infarct.   Echo: 8/6/19, LVEF 55%, LA 3.1cm, mild MR, TR. RVSP<35   Cardiac Cath: 5/28/10, no significant CAD, only minor luminal irregularities.     70 year old woman with history of HTN, HLD, hypothyroidism, LBBB, recurrent syncope and palpitation found to have frequent PVCs on Holter monitoring and underwent EP study and ILR implant in 8/2020. ILR monitoring revealed PVC burden of 1.1% and no events. She has requested for the loop to be removed and now presents electively for ILR explant.   BP noted to be elevated on arrival (SBP ~ 200). Pt also reports headache since this morning. Denies any nausea, vomiting, visual changes, weakness or paresthesias.     Cardiology Summary  Stress Test: 8/19/19, normal, LVEF 64%. No ischemia/infarct.   Echo: 8/6/19, LVEF 55%, LA 3.1cm, mild MR, TR. RVSP<35   Cardiac Cath: 5/28/10, no significant CAD, only minor luminal irregularities.

## 2022-05-20 NOTE — ASU DISCHARGE PLAN (ADULT/PEDIATRIC) - CARE PROVIDER_API CALL
Walter Foster (DO)  Cardiovascular Disease; Internal Medicine  [facility], [address], [address]  [place] [address]  Phone: [phone]  Fax: [phone]  Follow Up Time:     Alexis Salazar)  Cardiology; Internal Medicine  [address], [address]  [place] [address]  Phone: [phone]  Fax: [phone]  Follow Up Time:

## 2022-05-20 NOTE — DISCHARGE NOTE NURSING/CASE MANAGEMENT/SOCIAL WORK - NSDCPEFALRISK_GEN_ALL_CORE
For information on Fall & Injury Prevention, visit: [email or web address] OR  [email or web address] OR  https://www.cdc.gov/steadi/patient.html

## 2022-05-20 NOTE — H&P PST ADULT - NSICDXPASTSURGICALHX_GEN_ALL_CORE_FT
PAST SURGICAL HISTORY:  History of electrophysiologic study and ILR implant 2020    S/P cholecystectomy

## 2022-05-20 NOTE — H&P PST ADULT - ASSESSMENT
70 year old woman with history of HTN, HLD, hypothyroidism, LBBB, recurrent syncope and palpitation found to have frequent PVCs on Holter monitoring and underwent EP study and ILR implant in 8/2020. ILR monitoring revealed PVC burden of 1.1% and no events. She has requested for the loop to be removed and now presents electively for ILR explant.     Plan:  BP noted to be elevated with SBP ~ 200; repeated multiple times. Pt also reports headache since this morning. Denies any nausea, vomiting, visual changes, weakness or paresthesias, neuro intact. Will treat with IV hydralazine and instructed to keep a BP log and follow up with Dr. Foster.  Consent w/ Attending  Clinda 600mg PO x 1 pre procedure  Covid pcr negative on 5/17

## 2022-07-13 RX ORDER — GABAPENTIN 400 MG/1
1 CAPSULE ORAL
Qty: 0 | Refills: 0 | DISCHARGE

## 2022-07-13 RX ORDER — LEVOTHYROXINE SODIUM 125 MCG
1 TABLET ORAL
Qty: 0 | Refills: 0 | DISCHARGE

## 2022-07-13 RX ORDER — LOSARTAN POTASSIUM 100 MG/1
1 TABLET, FILM COATED ORAL
Qty: 0 | Refills: 0 | DISCHARGE

## 2022-07-13 RX ORDER — TRAZODONE HCL 50 MG
1 TABLET ORAL
Qty: 0 | Refills: 0 | DISCHARGE

## 2022-07-13 RX ORDER — FUROSEMIDE 40 MG
1 TABLET ORAL
Qty: 0 | Refills: 0 | DISCHARGE

## 2022-11-01 NOTE — DISCHARGE NOTE PROVIDER - NSDCCPTREATMENT_GEN_ALL_CORE_FT
PRINCIPAL PROCEDURE  Procedure: Full cardiac electrophysiology study  Findings and Treatment: - Bruising at the groin, sometimes extending down the leg, and/or a small lump under the skin at the groin access site is normal and will resolve within 2 – 3 weeks.   - You may walk and take stairs at a regular pace.   - Do not perform any exercise more strenuous than walking for 1 week.   - Do not strain or lift heavy objects for 1 week.  - You may shower the day after the procedure.  - Do not soak in water (such as tub baths, hot tubs, swimming, etc.) for 1 week.   - You may resume all other activities the day after the procedure.  Call your doctor if:   - you notice bleeding, redness, drainage, swelling, increased tenderness or a hot sensation around the catheter insertion site.   - your temperature is greater than 100 degrees F for more than 24 hours.  - you have any questions or concerns regarding the procedure.  If significant bleeding and/or a large lump (the size of a golf ball or bigger) occurs:  - Lie flat and apply continuous direct pressure just above the puncture site for at least 10 minutes  - If the issue resolves, notify your physician immediately.    - If the bleeding cannot be controlled, please seek immediate medical attention.  If you experience increased difficulty breathing or chest pain, or if you faint or have dizzy spells, please seek immediate medical attention.      SECONDARY PROCEDURE  Procedure: Insertion, loop recorder  Findings and Treatment: Loop Recorder Incision Care:     - Do not touch the incision until it is completely healed.   - There is Dermabond (skin glue) on your incision, which will start to flake off on its own over the next 2-3 weeks. Do not pick at or peel off the Dermabond.   - Do not apply soaps, creams, lotions, ointments or powders to the incision until it is completely healed.  - You should call the doctor if you notice redness, drainage, swelling, increased tenderness, hot sensation around the  incision, bleeding or incision edges pulling apart.
No

## 2023-03-21 ENCOUNTER — NON-APPOINTMENT (OUTPATIENT)
Age: 73
End: 2023-03-21

## 2023-09-04 ENCOUNTER — APPOINTMENT (OUTPATIENT)
Dept: ORTHOPEDIC SURGERY | Facility: CLINIC | Age: 73
End: 2023-09-04
Payer: MEDICARE

## 2023-09-04 VITALS — BODY MASS INDEX: 29.32 KG/M2 | HEIGHT: 65 IN | WEIGHT: 176 LBS

## 2023-09-04 DIAGNOSIS — S89.92XA UNSPECIFIED INJURY OF LEFT LOWER LEG, INITIAL ENCOUNTER: ICD-10-CM

## 2023-09-04 PROBLEM — I49.3 VENTRICULAR PREMATURE DEPOLARIZATION: Chronic | Status: ACTIVE | Noted: 2020-08-17

## 2023-09-04 PROBLEM — E03.9 HYPOTHYROIDISM, UNSPECIFIED: Chronic | Status: ACTIVE | Noted: 2020-08-17

## 2023-09-04 PROBLEM — I44.7 LEFT BUNDLE-BRANCH BLOCK, UNSPECIFIED: Chronic | Status: ACTIVE | Noted: 2020-08-17

## 2023-09-04 PROBLEM — I10 ESSENTIAL (PRIMARY) HYPERTENSION: Chronic | Status: ACTIVE | Noted: 2020-08-17

## 2023-09-04 PROCEDURE — L1833: CPT | Mod: KV,KX,LT

## 2023-09-04 PROCEDURE — 73630 X-RAY EXAM OF FOOT: CPT | Mod: LT

## 2023-09-04 PROCEDURE — 99203 OFFICE O/P NEW LOW 30 MIN: CPT

## 2023-09-04 PROCEDURE — 73562 X-RAY EXAM OF KNEE 3: CPT | Mod: LT

## 2023-09-04 RX ORDER — TRAMADOL HYDROCHLORIDE 50 MG/1
50 TABLET, COATED ORAL
Qty: 15 | Refills: 0 | Status: ACTIVE | COMMUNITY
Start: 2023-09-04 | End: 1900-01-01

## 2023-09-04 NOTE — IMAGING
[de-identified] : PE L knee: +swelling, +tenderness to proximal medial tibia, no tenderness laterally, ligs stable, no JLT, able to SLR, FROM, NVI PE L foot: +swelling to 2nd toe, +tenderness over 2nd toe, no tenderness at lisfranc area, no tenderness at ankle, EHL/FHL/ADF/APF intact, sensory intact, calves soft, NT [Left] : left foot [There are no fractures, subluxations or dislocations. No significant abnormalities are seen] : There are no fractures, subluxations or dislocations. No significant abnormalities are seen

## 2023-09-04 NOTE — ASSESSMENT
[FreeTextEntry1] : A/P L knee r/o tibial plateau fracture - CT - brace applied to patient - WBAT with cane/walker - ice/elevation - pain medication - f/u knee

## 2023-09-04 NOTE — HISTORY OF PRESENT ILLNESS
[8] : 8 [7] : 7 [Dull/Aching] : dull/aching [Sharp] : sharp [Intermittent] : intermittent [Nothing helps with pain getting better] : Nothing helps with pain getting better [de-identified] : 72 y/o F with L knee and L toe pain s/p fall yesterday. denies buckling or locking. denies numbness/tingling. She has not tried any interventions. difficulty walking. [] : no [FreeTextEntry1] : LT Knee and LT Foot [FreeTextEntry5] : Yesterday, Pt fell down 2 stairs injuring her knee and her LT foot. Pt 2nd toe is bruised, and her knee is swollen.

## 2023-09-06 ENCOUNTER — APPOINTMENT (OUTPATIENT)
Dept: CT IMAGING | Facility: CLINIC | Age: 73
End: 2023-09-06
Payer: MEDICARE

## 2023-09-06 PROCEDURE — 73700 CT LOWER EXTREMITY W/O DYE: CPT | Mod: LT,MH

## 2023-09-06 PROCEDURE — 76376 3D RENDER W/INTRP POSTPROCES: CPT | Mod: LT

## 2023-09-07 NOTE — H&P PST ADULT - NEUROLOGICAL
September 14, 2023      Florina Esqueda  [address]  [place] [address]        To Whom It May Concern:    Florina Esqueda was seen in our clinic on 06/22/2023.  She is able to operate a motor vehicle.    Please let me know if there is any new question or concern.      Sincerely,        Charlotte Ha MD           negative Alert & oriented; no sensory, motor or coordination deficits, normal reflexes

## 2023-09-13 ENCOUNTER — APPOINTMENT (OUTPATIENT)
Dept: ORTHOPEDIC SURGERY | Facility: CLINIC | Age: 73
End: 2023-09-13
Payer: MEDICARE

## 2023-09-13 VITALS — WEIGHT: 175 LBS | HEIGHT: 65 IN | BODY MASS INDEX: 29.16 KG/M2

## 2023-09-13 DIAGNOSIS — S83.412A SPRAIN OF MEDIAL COLLATERAL LIGAMENT OF LEFT KNEE, INITIAL ENCOUNTER: ICD-10-CM

## 2023-09-13 PROCEDURE — 99213 OFFICE O/P EST LOW 20 MIN: CPT

## (undated) DEVICE — CATH IV SAFE 24GX3/4

## (undated) DEVICE — GLV 7.5 ULTRAFREE MAX

## (undated) DEVICE — SOL INJ LR 500ML

## (undated) DEVICE — CATH IV SAFE BC BLU 22GAX1.0"

## (undated) DEVICE — PACK IV START WITH CHG

## (undated) DEVICE — NDL SPNL WHIT 22GX3.5IN

## (undated) DEVICE — GLV 8 ULTRAFREE MAX

## (undated) DEVICE — TRAY EPIDURAL SINGLE DOSE